# Patient Record
Sex: FEMALE | Employment: FULL TIME | URBAN - METROPOLITAN AREA
[De-identification: names, ages, dates, MRNs, and addresses within clinical notes are randomized per-mention and may not be internally consistent; named-entity substitution may affect disease eponyms.]

---

## 2017-09-29 ENCOUNTER — RADIANT APPOINTMENT (OUTPATIENT)
Dept: GENERAL RADIOLOGY | Facility: CLINIC | Age: 27
End: 2017-09-29
Attending: FAMILY MEDICINE
Payer: COMMERCIAL

## 2017-09-29 ENCOUNTER — OFFICE VISIT (OUTPATIENT)
Dept: URGENT CARE | Facility: URGENT CARE | Age: 27
End: 2017-09-29
Payer: COMMERCIAL

## 2017-09-29 VITALS
DIASTOLIC BLOOD PRESSURE: 80 MMHG | HEART RATE: 69 BPM | SYSTOLIC BLOOD PRESSURE: 120 MMHG | OXYGEN SATURATION: 98 % | TEMPERATURE: 98.1 F

## 2017-09-29 DIAGNOSIS — M79.671 RIGHT FOOT PAIN: ICD-10-CM

## 2017-09-29 DIAGNOSIS — M79.89 SWELLING OF LIMB: Primary | ICD-10-CM

## 2017-09-29 LAB — ERYTHROCYTE [SEDIMENTATION RATE] IN BLOOD BY WESTERGREN METHOD: 11 MM/H (ref 0–20)

## 2017-09-29 PROCEDURE — 85652 RBC SED RATE AUTOMATED: CPT | Performed by: FAMILY MEDICINE

## 2017-09-29 PROCEDURE — 99203 OFFICE O/P NEW LOW 30 MIN: CPT | Performed by: FAMILY MEDICINE

## 2017-09-29 PROCEDURE — 80053 COMPREHEN METABOLIC PANEL: CPT | Performed by: FAMILY MEDICINE

## 2017-09-29 PROCEDURE — 73630 X-RAY EXAM OF FOOT: CPT | Mod: RT

## 2017-09-29 PROCEDURE — 36415 COLL VENOUS BLD VENIPUNCTURE: CPT | Performed by: FAMILY MEDICINE

## 2017-09-29 RX ORDER — ALBUTEROL SULFATE 0.83 MG/ML
1 SOLUTION RESPIRATORY (INHALATION) EVERY 6 HOURS PRN
COMMUNITY

## 2017-09-29 RX ORDER — FUROSEMIDE 20 MG
10 TABLET ORAL 2 TIMES DAILY
Qty: 15 TABLET | Refills: 1 | Status: SHIPPED | OUTPATIENT
Start: 2017-09-29

## 2017-09-29 NOTE — MR AVS SNAPSHOT
"              After Visit Summary   2017    Chasidy Best    MRN: [id number]           Patient Information     Date Of Birth          1990        Visit Information        Provider Department      2017 6:05 PM Jordi Muller MD [facility]        Today's Diagnoses     Swelling of limb    -  1    Right foot pain          Care Instructions    1. Compression stockings daily  2. Medication lasix daily  3. Elevation of extremities daily  4. Follow PCP in 2 weeks.           Follow-ups after your visit        Who to contact     If you have questions or need follow up information about today's clinic visit or your schedule please contact [facility] directly at [phone].  Normal or non-critical lab and imaging results will be communicated to you by MyChart, letter or phone within 4 business days after the clinic has received the results. If you do not hear from us within 7 days, please contact the clinic through MyChart or phone. If you have a critical or abnormal lab result, we will notify you by phone as soon as possible.  Submit refill requests through [employer] or call your pharmacy and they will forward the refill request to us. Please allow 3 business days for your refill to be completed.          Additional Information About Your Visit        MyChart Information     [employer] lets you send messages to your doctor, view your test results, renew your prescriptions, schedule appointments and more. To sign up, go to [email or web address] . Click on \"Log in\" on the left side of the screen, which will take you to the Welcome page. Then click on \"Sign up Now\" on the right side of the page.     You will be asked to enter the access code listed below, as well as some personal information. Please follow the directions to create your username and password.     Your access code is: [email or web address]  Expires: 2017  7:11 PM     Your access code will  in 90 days. If you " need help or a new code, please call your [place] clinic or [phone].        Care EveryWhere ID     This is your Care EveryWhere ID. This could be used by other organizations to access your [facility] medical records  [id number]        Your Vitals Were     Pulse Temperature Pulse Oximetry             69 98.1  F (36.7  C) (Oral) 98%          Blood Pressure from Last 3 Encounters:   09/29/17 120/80    Weight from Last 3 Encounters:   No data found for Wt              We Performed the Following     Comprehensive metabolic panel     ESR: Erythrocyte sedimentation rate          Today's Medication Changes          These changes are accurate as of: 9/29/17  7:11 PM.  If you have any questions, ask your nurse or doctor.               Start taking these medicines.        Dose/Directions    furosemide 20 MG tablet   Commonly known as:  LASIX   Used for:  Swelling of limb   Started by:  Jordi Muller MD        Dose:  10 mg   Take 0.5 tablets (10 mg) by mouth 2 times daily   Quantity:  15 tablet   Refills:  1            Where to get your medicines      These medications were sent to [address] [address]    Hours:  Tech issues with their phone system Phone:  [phone]     furosemide 20 MG tablet                Primary Care Provider    None Specified       No primary provider on file.        Equal Access to Services     CARLO THORPE : Jefferson daso Socristin, wanhanda luqadaha, qaybta kaalmada adelorenzo, blossom hardin. So [facility] [phone].    ATENCIÓN: Si habla español, tiene a landaverde disposición servicios gratuitos de asistencia lingüística. Llfredrick al [phone].    We comply with applicable federal civil rights laws and [place] laws. We do not discriminate on the basis of race, color, national origin, age, disability, sex, sexual orientation, or gender identity.            Thank you!     Thank you for  choosing [facility]  for your care. Our goal is always to provide you with excellent care. Hearing back from our patients is one way we can continue to improve our services. Please take a few minutes to complete the written survey that you may receive in the mail after your visit with us. Thank you!             Your Updated Medication List - Protect others around you: Learn how to safely use, store and throw away your medicines at www.disposemymeds.org.          This list is accurate as of: 9/29/17  7:11 PM.  Always use your most recent med list.                   Brand Name Dispense Instructions for use Diagnosis    albuterol (2.5 MG/3ML) 0.083% neb solution      Take 1 vial by nebulization every 6 hours as needed for shortness of breath / dyspnea or wheezing        furosemide 20 MG tablet    LASIX    15 tablet    Take 0.5 tablets (10 mg) by mouth 2 times daily    Swelling of limb

## 2017-09-29 NOTE — PROGRESS NOTES
SUBJECTIVE:   Chasidy Best is a 27 year old female who presents to clinic today for the following health issues:      Bilateral leg swelling      Duration: almost a year    Description (location/character/radiation): Bilateral leg    Intensity:   severe    Accompanying signs and symptoms: pain from distal R foot going up to leg    History (similar episodes/previous evaluation): Had heart ablation las Nov/2016    Precipitating or alleviating factors: Elevating    Therapies tried and outcome: None             Problem list and histories reviewed & adjusted, as indicated.  Additional history:     There is no problem list on file for this patient.    No past surgical history on file.    Social History   Substance Use Topics     Smoking status: Never Smoker     Smokeless tobacco: Never Used     Alcohol use Not on file     No family history on file.          Reviewed and updated as needed this visit by clinical staff     Reviewed and updated as needed this visit by Provider         ROS:  Constitutional, HEENT, cardiovascular, pulmonary, gi and gu systems are negative, except as otherwise noted.      OBJECTIVE:   /80 (BP Location: Right arm, Patient Position: Chair, Cuff Size: Adult Regular)  Pulse 69  Temp 98.1  F (36.7  C) (Oral)  SpO2 98%  There is no height or weight on file to calculate BMI.  GENERAL: healthy, alert and no distress  NECK: no adenopathy, no asymmetry, masses, or scars and thyroid normal to palpation  RESP: lungs clear to auscultation - no rales, rhonchi or wheezes  CV: regular rate and rhythm, normal S1 S2, no S3 or S4, no murmur, click or rub, no peripheral edema and peripheral pulses strong  ABDOMEN: soft, nontender, no hepatosplenomegaly, no masses and bowel sounds normal.  MS: bilateral there is swelling in the ankles she is complaining of pain in the right foot. Good range of motion of the ankles and the feet with some slight tenderness of the dorsum of the right foot. Distal CMS is  normal    Diagnostic Test Results:  Xray; negative  Labs pending    ASSESSMENT/PLAN:               ICD-10-CM    1. Swelling of limb M79.89 Comprehensive metabolic panel     ESR: Erythrocyte sedimentation rate     furosemide (LASIX) 20 MG tablet   2. Right foot pain M79.671 Comprehensive metabolic panel     CANCELED: XR Foot Left G/E 3 Views     27-year-old with bilateral leg swelling. She's had this before responded to diuretic.    Etiology of this is not known at this time she otherwise appears healthy I'm not necessarily concerned that she has some type of kidney problem this could be more vascular in nature with some leaking  Venous system.  There is no height or weight on file to calculate BMI.  We are unable to find out what her BMI is but she does have a  I suspected elevated BMI which could be causing increasing pressure in the lower extremities.    No hard signs or symptoms to indicate this as an etiology she is certainly not in congestive heart failure.    Small dose of a diuretic pressure stockings.    Fluid management but fluids with electrolytes    Follow-up with her primary care within the week    Jordi Muller MD  [facility]

## 2017-09-29 NOTE — NURSING NOTE
Chief Complaint   Patient presents with     Urgent Care     Edema     Bilateral Leg swelling       Initial /80 (BP Location: Right arm, Patient Position: Chair, Cuff Size: Adult Regular)  Pulse 69  Temp 98.1  F (36.7  C) (Oral)  SpO2 98% There is no height or weight on file to calculate BMI.  Medication Reconciliation: complete     Sofia German CMA (AAMA)

## 2017-09-30 LAB
ALBUMIN SERPL-MCNC: 4.1 G/DL (ref 3.4–5)
ALP SERPL-CCNC: 70 U/L (ref 40–150)
ALT SERPL W P-5'-P-CCNC: 49 U/L (ref 0–50)
ANION GAP SERPL CALCULATED.3IONS-SCNC: 6 MMOL/L (ref 3–14)
AST SERPL W P-5'-P-CCNC: 33 U/L (ref 0–45)
BILIRUB SERPL-MCNC: 0.4 MG/DL (ref 0.2–1.3)
BUN SERPL-MCNC: 12 MG/DL (ref 7–30)
CALCIUM SERPL-MCNC: 9.8 MG/DL (ref 8.5–10.1)
CHLORIDE SERPL-SCNC: 106 MMOL/L (ref 94–109)
CO2 SERPL-SCNC: 24 MMOL/L (ref 20–32)
CREAT SERPL-MCNC: 0.79 MG/DL (ref 0.52–1.04)
GFR SERPL CREATININE-BSD FRML MDRD: 87 ML/MIN/1.7M2
GLUCOSE SERPL-MCNC: 79 MG/DL (ref 70–99)
POTASSIUM SERPL-SCNC: 4.2 MMOL/L (ref 3.4–5.3)
PROT SERPL-MCNC: 8.2 G/DL (ref 6.8–8.8)
SODIUM SERPL-SCNC: 136 MMOL/L (ref 133–144)

## 2017-09-30 NOTE — PATIENT INSTRUCTIONS
1. Compression stockings daily  2. Medication lasix daily  3. Elevation of extremities daily  4. Follow PCP in 2 weeks.

## 2019-12-03 ENCOUNTER — APPOINTMENT (OUTPATIENT)
Dept: CT IMAGING | Facility: CLINIC | Age: 29
End: 2019-12-03
Attending: EMERGENCY MEDICINE
Payer: COMMERCIAL

## 2019-12-03 ENCOUNTER — APPOINTMENT (OUTPATIENT)
Dept: MRI IMAGING | Facility: CLINIC | Age: 29
End: 2019-12-03
Attending: EMERGENCY MEDICINE
Payer: COMMERCIAL

## 2019-12-03 ENCOUNTER — HOSPITAL ENCOUNTER (EMERGENCY)
Facility: CLINIC | Age: 29
Discharge: HOME | End: 2019-12-03
Attending: EMERGENCY MEDICINE | Admitting: EMERGENCY MEDICINE
Payer: COMMERCIAL

## 2019-12-03 VITALS
TEMPERATURE: 97.9 F | WEIGHT: 205 LBS | HEART RATE: 67 BPM | BODY MASS INDEX: 32.18 KG/M2 | HEIGHT: 67 IN | OXYGEN SATURATION: 100 % | SYSTOLIC BLOOD PRESSURE: 136 MMHG | RESPIRATION RATE: 14 BRPM | DIASTOLIC BLOOD PRESSURE: 90 MMHG

## 2019-12-03 DIAGNOSIS — R20.2 FACIAL PARESTHESIA: ICD-10-CM

## 2019-12-03 DIAGNOSIS — R51.9 PRESSURE IN HEAD: ICD-10-CM

## 2019-12-03 LAB
ANION GAP SERPL CALCULATED.3IONS-SCNC: 2 MMOL/L (ref 3–14)
APTT PPP: 33 SEC (ref 22–37)
B-HCG FREE SERPL-ACNC: <5 IU/L
BASOPHILS # BLD AUTO: 0 10E9/L (ref 0–0.2)
BASOPHILS NFR BLD AUTO: 0.5 %
BUN SERPL-MCNC: 11 MG/DL (ref 7–30)
CALCIUM SERPL-MCNC: 9.1 MG/DL (ref 8.5–10.1)
CHLORIDE SERPL-SCNC: 108 MMOL/L (ref 94–109)
CO2 SERPL-SCNC: 27 MMOL/L (ref 20–32)
CREAT SERPL-MCNC: 0.77 MG/DL (ref 0.52–1.04)
DIFFERENTIAL METHOD BLD: NORMAL
EOSINOPHIL # BLD AUTO: 0.2 10E9/L (ref 0–0.7)
EOSINOPHIL NFR BLD AUTO: 3.1 %
ERYTHROCYTE [DISTWIDTH] IN BLOOD BY AUTOMATED COUNT: 12.9 % (ref 10–15)
GFR SERPL CREATININE-BSD FRML MDRD: >90 ML/MIN/{1.73_M2}
GLUCOSE SERPL-MCNC: 86 MG/DL (ref 70–99)
HCT VFR BLD AUTO: 39.9 % (ref 35–47)
HGB BLD-MCNC: 13.4 G/DL (ref 11.7–15.7)
IMM GRANULOCYTES # BLD: 0 10E9/L (ref 0–0.4)
IMM GRANULOCYTES NFR BLD: 0.2 %
INR PPP: 1.08 (ref 0.86–1.14)
INTERPRETATION ECG - MUSE: NORMAL
LYMPHOCYTES # BLD AUTO: 2.1 10E9/L (ref 0.8–5.3)
LYMPHOCYTES NFR BLD AUTO: 33.4 %
MCH RBC QN AUTO: 29.9 PG (ref 26.5–33)
MCHC RBC AUTO-ENTMCNC: 33.6 G/DL (ref 31.5–36.5)
MCV RBC AUTO: 89 FL (ref 78–100)
MONOCYTES # BLD AUTO: 0.6 10E9/L (ref 0–1.3)
MONOCYTES NFR BLD AUTO: 9.2 %
NEUTROPHILS # BLD AUTO: 3.4 10E9/L (ref 1.6–8.3)
NEUTROPHILS NFR BLD AUTO: 53.6 %
NRBC # BLD AUTO: 0 10*3/UL
NRBC BLD AUTO-RTO: 0 /100
PLATELET # BLD AUTO: 227 10E9/L (ref 150–450)
POTASSIUM SERPL-SCNC: 3.7 MMOL/L (ref 3.4–5.3)
RBC # BLD AUTO: 4.48 10E12/L (ref 3.8–5.2)
SODIUM SERPL-SCNC: 137 MMOL/L (ref 133–144)
WBC # BLD AUTO: 6.4 10E9/L (ref 4–11)

## 2019-12-03 PROCEDURE — 70553 MRI BRAIN STEM W/O & W/DYE: CPT

## 2019-12-03 PROCEDURE — 99285 EMERGENCY DEPT VISIT HI MDM: CPT | Mod: 25

## 2019-12-03 PROCEDURE — 85610 PROTHROMBIN TIME: CPT | Performed by: EMERGENCY MEDICINE

## 2019-12-03 PROCEDURE — 84702 CHORIONIC GONADOTROPIN TEST: CPT

## 2019-12-03 PROCEDURE — 93005 ELECTROCARDIOGRAM TRACING: CPT

## 2019-12-03 PROCEDURE — A9585 GADOBUTROL INJECTION: HCPCS | Performed by: EMERGENCY MEDICINE

## 2019-12-03 PROCEDURE — 70450 CT HEAD/BRAIN W/O DYE: CPT

## 2019-12-03 PROCEDURE — 25500064 ZZH RX 255 OP 636: Performed by: EMERGENCY MEDICINE

## 2019-12-03 PROCEDURE — 80048 BASIC METABOLIC PNL TOTAL CA: CPT | Performed by: EMERGENCY MEDICINE

## 2019-12-03 PROCEDURE — 85025 COMPLETE CBC W/AUTO DIFF WBC: CPT | Performed by: EMERGENCY MEDICINE

## 2019-12-03 PROCEDURE — 72156 MRI NECK SPINE W/O & W/DYE: CPT

## 2019-12-03 PROCEDURE — 99222 1ST HOSP IP/OBS MODERATE 55: CPT | Performed by: PSYCHIATRY & NEUROLOGY

## 2019-12-03 PROCEDURE — 85730 THROMBOPLASTIN TIME PARTIAL: CPT | Performed by: EMERGENCY MEDICINE

## 2019-12-03 RX ORDER — GADOBUTROL 604.72 MG/ML
9 INJECTION INTRAVENOUS ONCE
Status: COMPLETED | OUTPATIENT
Start: 2019-12-03 | End: 2019-12-03

## 2019-12-03 RX ADMIN — GADOBUTROL 9 ML: 604.72 INJECTION INTRAVENOUS at 16:28

## 2019-12-03 ASSESSMENT — ENCOUNTER SYMPTOMS
HEADACHES: 1
LIGHT-HEADEDNESS: 1
WEAKNESS: 0
NUMBNESS: 1

## 2019-12-03 ASSESSMENT — MIFFLIN-ST. JEOR: SCORE: 1687.5

## 2019-12-03 NOTE — ED PROVIDER NOTES
"  History     Chief Complaint:  Blurred vision, facial numbness, and head pressure    HPI   Chasidy Best is a 29 year old female with a history of right-sided optic neuritis who presents to the emergency department for evaluation of blurred vision, facial numbness, and head pressure. The patient reports she developed right-sided head pressure, right-sided facial numbness, and blurred vision in her right eye for 5-10 minutes just after 1100. She further reports feeling lightheaded during this episode. She also states she felt like someone was \"pulling the right side of her face down.\" She denies other right-sided weakness. Upon examination in the ED, the patient reports her vision has improved, but sensation in the right side of her face is still not normal. She indicates the head pressure has been intermittently present since its onset. She denies slurred speech.     Allergies:  Prednisone   Topical steroids      Medications:    The patient is currently on no regular medications.     Past Medical History:    Optic neuritis     Past Surgical History:    [place] teeth extraction  Implantable loop recorder   EP Study/Ablation     Family History:    Diabetes   HLD  HTN    Social History:  Presents with mother.  Former smoker.  Positive for alcohol use.    Negative for drug use.   Marital Status:  Single [1]     Review of Systems   Eyes: Positive for visual disturbance.   Neurological: Positive for light-headedness, numbness and headaches. Negative for weakness.   All other systems reviewed and are negative.      Physical Exam     Patient Vitals for the past 24 hrs:   BP Temp Temp src Pulse Resp SpO2 Height Weight   12/03/19 1242 128/83 97.9  F (36.6  C) Temporal 65 20 99 % 1.702 m (5' 7\") 93 kg (205 lb)      Physical Exam  General: Alert and cooperative with exam. Patient in mild distress. Normal mentation.  Head:  Scalp is NC/AT  Eyes:  No scleral icterus, PERRL, EOMI   ENT:  The external nose and ears are normal. The " oropharynx is normal and without erythema; mucus membranes are moist. Uvula midline, no evidence of deep space infection. Vision grossly normal  Neck:  Normal range of motion without rigidity.  CV:  Regular rate and rhythm    No pathologic murmur   Resp:  Breath sounds are clear bilaterally    Non-labored, no retractions or accessory muscle use  GI:  Abdomen is soft, no distension, no tenderness. No peritoneal signs  MS:  No lower extremity edema   Skin:  Warm and dry, No rash or lesions noted.  Neuro: Oriented x 3. No gross motor deficits.    Strength and sensation grossly intact in all 4 extremities.      Cranial nerves 2-12 intact with exception of paraesthesia to right face.    GCS: 15    Emergency Department Course     ECG:  Time: 1355  Vent. Rate 66 bpm. NJ interval 144. QRS duration 76. QT/QTc 402/421. P-R-T axis 9 20 17.  Normal sinus rhythm.  Normal ECG.  Read time: 1400     Imaging:  Radiographic findings were communicated with the patient and family who voiced understanding of the findings.    MR Cervical Spine w/o & w Contrast   Final Result   IMPRESSION:   1. No evidence for demyelinating disease or any cord impingement.   2. Minimal C5-C6 disc bulge without stenosis.         MARYA ALDRICH MD      MR Brain w/o & w Contrast   Final Result   IMPRESSION: Normal MRI of the brain.         MARYA ALDRICH MD      CT Head w/o Contrast   Final Result   IMPRESSION: Normal CT scan of the head.         PREETI LEGGETT MD            Laboratory:  BMP: Anion Gap 2 (L), o/w WNL (Creatinine: 0.77)     CBC: WBC: 6.4, HGB: 13.4, PLT: 227    INR: 1.08    PTT: 33    HCG Quantitative Pregnancy: <5.0     Emergency Department Course:  Nursing notes and vitals reviewed. 1314 I performed an exam of the patient as documented above.     1317 Code stroke called.     IV inserted. Medicine administered as documented above. Blood drawn. This was sent to the lab for further testing, results above.    The patient was sent for a CT Head, MR  Cervical Spine, and MR Brain while in the emergency department, findings above.     1328 Code stroke de-escalated.     1342 I rechecked the patient and discussed the results of her workup thus far.     1355 EKG obtained in the ED, see results above.      Findings and plan explained to the Patient and mother. Patient discharged home with instructions regarding supportive care, medications, and reasons to return. The importance of close follow-up was reviewed.     I personally reviewed the laboratory results with the Patient and mother and answered all related questions prior to discharge.    Impression & Plan      Medical Decision Making:  Patient is a 29-year-old female presents with right facial paresthesia and head pressure; history of optic neuritis.  On evaluation patient did note right facial paresthesia but was otherwise neurologically intact.  Code stroke was initiated and CT imaging was negative.  Labs unremarkable as noted above.  Given reported history of optic neuritis patient was sent for MRI brain as well as MRI cervical spine per stroke neurology recommendations.  MRIs unremarkable. On reevaluation patient is asymptomatic. Possible complex headache.  Discharged home and recommended close follow-up with PCP as well as supportive care.      Diagnosis:    ICD-10-CM    1. Facial paresthesia R20.9    2. Pressure in head R51        Disposition:  discharged to home      Scribe Disclosure:  I, Rocío Mandel, am serving as a scribe on 12/3/2019 at 1:21 PM to personally document services performed by Ryley Veras DO based on my observations and the provider's statements to me.      Rocío Mandel  12/3/2019    EMERGENCY DEPARTMENT       Ryley Veras,   12/03/19 2136       Ryley Veras,   12/03/19 2137

## 2019-12-03 NOTE — ED TRIAGE NOTES
Pt. States she was sitting at desk and right side of face felt numb and right eye blurred. Pt. Also states like she felt like she was leaning to the left. Dizziness also for 5 minutes.

## 2019-12-03 NOTE — ED AVS SNAPSHOT
Emergency Department  [address] [place] [address]  Phone:  [phone]  Fax:  [phone]                                    Chasidy Best   MRN: [id number]    Department:   Emergency Department   Date of Visit:  12/3/2019           After Visit Summary Signature Page    I have received my discharge instructions, and my questions have been answered. I have discussed any challenges I see with this plan with the nurse or doctor.    ..........................................................................................................................................  Patient/Patient Representative Signature      ..........................................................................................................................................  Patient Representative Print Name and Relationship to Patient    ..................................................               ................................................  Date                                   Time    ..........................................................................................................................................  Reviewed by Signature/Title    ...................................................              ..............................................  Date                                               Time          22EPIC Rev 08/18
